# Patient Record
Sex: FEMALE | Race: WHITE | Employment: FULL TIME | URBAN - NONMETROPOLITAN AREA
[De-identification: names, ages, dates, MRNs, and addresses within clinical notes are randomized per-mention and may not be internally consistent; named-entity substitution may affect disease eponyms.]

---

## 2019-02-23 ENCOUNTER — OFFICE VISIT (OUTPATIENT)
Dept: PRIMARY CARE CLINIC | Age: 49
End: 2019-02-23
Payer: COMMERCIAL

## 2019-02-23 VITALS
TEMPERATURE: 97.8 F | WEIGHT: 222 LBS | OXYGEN SATURATION: 97 % | HEART RATE: 80 BPM | DIASTOLIC BLOOD PRESSURE: 88 MMHG | SYSTOLIC BLOOD PRESSURE: 122 MMHG

## 2019-02-23 DIAGNOSIS — J01.00 SUBACUTE MAXILLARY SINUSITIS: Primary | ICD-10-CM

## 2019-02-23 PROCEDURE — 99202 OFFICE O/P NEW SF 15 MIN: CPT | Performed by: NURSE PRACTITIONER

## 2019-02-23 RX ORDER — AMOXICILLIN AND CLAVULANATE POTASSIUM 875; 125 MG/1; MG/1
1 TABLET, FILM COATED ORAL 2 TIMES DAILY
Qty: 14 TABLET | Refills: 0 | Status: SHIPPED | OUTPATIENT
Start: 2019-02-23 | End: 2019-03-02

## 2019-02-23 ASSESSMENT — ENCOUNTER SYMPTOMS
SORE THROAT: 0
HOARSE VOICE: 0
SHORTNESS OF BREATH: 0
SWOLLEN GLANDS: 1
SINUS PRESSURE: 1
COUGH: 1

## 2023-11-04 DIAGNOSIS — B00.1 HERPESVIRAL VESICULAR DERMATITIS: ICD-10-CM

## 2023-11-04 RX ORDER — VALACYCLOVIR HYDROCHLORIDE 1 G/1
TABLET, FILM COATED ORAL
Qty: 4 TABLET | Refills: 5 | Status: SHIPPED | OUTPATIENT
Start: 2023-11-04

## 2023-11-08 PROBLEM — Z86.59 HISTORY OF DEPRESSION: Status: ACTIVE | Noted: 2023-11-08

## 2023-11-08 PROBLEM — R73.01 IMPAIRED FASTING GLUCOSE: Status: ACTIVE | Noted: 2023-11-08

## 2023-11-08 PROBLEM — D25.9 UTERINE LEIOMYOMA: Status: ACTIVE | Noted: 2023-11-08

## 2023-11-08 PROBLEM — D64.9 ANEMIA: Status: ACTIVE | Noted: 2023-11-08

## 2023-11-12 NOTE — PROGRESS NOTES
HPI:  pt here for routine wellness exam w/o any issues    PMH:   Past Medical History:   Diagnosis Date    Allergic Unknown    Fasting hyperglycemia     History of depression      MEDICATIONS:   Current Outpatient Medications   Medication Sig Dispense Refill    valACYclovir (Valtrex) 1 gram tablet TAKE 2 TABLETS BY MOUTH TWICE A DAY FOR 1 DAY, TAKE 12 HOURS APART 4 tablet 5     No current facility-administered medications for this visit.     ALLERGIES:    Allergies   Allergen Reactions    Diphenhydramine Hcl Drowsiness    Nutmeg Diarrhea and GI Upset     SOCIAL HX:    Social History     Tobacco Use    Smoking status: Never    Smokeless tobacco: Never   Vaping Use    Vaping Use: Never used   Substance Use Topics    Alcohol use: Not Currently     Comment: Maybe 2 drinks a year    Drug use: Never     FAMILY HX:   Family History   Problem Relation Name Age of Onset    No Known Problems Mother      No Known Problems Sister      No Known Problems Brother      No Known Problems Brother      No Known Problems Daughter      No Known Problems Daughter      No Known Problems Daughter         ROS:             CONSTITUTIONAL:          Negative for concerns, fever, chills.         HEENT:          no Difficulty swallowing.  no Hearing loss.  no Poor sense of smell.   No change in vision ; no headaches     CARDIOLOGY:          Chest pain none.  Palpitations none.  Shortness of breath none.         RESPIRATORY:          Negative for persistent cough , wheezing, trouble breathing.         GASTROENTEROLOGY:          Negative for abdominal pain, change in bowel habits.         ENDOCRINOLOGY:          Negative for fatigue, polydipsia, polyuria, weight loss, weight gain, cold intolerance, heat intolerance.         MUSCULOSKELETAL:          Negative for myalgias, joint pain.         DERMATOLOGY:          Negative for rash, bruising, moles.         NEUROLOGY:          Negative for weakness, headache, dizziness.     VITALS: /74    "Pulse 84   Ht 1.689 m (5' 6.5\")   Wt 73.1 kg (161 lb 3.2 oz)   SpO2 98%   BMI 25.63 kg/m²      PE:  General Appearance: awake, alert, oriented, in no acute distress  Skin: there are no suspicious lesions or rashes of concern  Head/Face: NCAT  Eyes: No gross abnormalities.  Ears: canals and TMs NI  Mouth/Throat: Mucosa moist, no lesions; pharynx without erythema, edema or exudate.  Neck: neck- supple, no mass, non-tender  Lungs: Normal expansion.  Clear to auscultation.  No rales, rhonchi, or wheezing.  Heart: Heart sounds are normal.  Regular rate and rhythm without murmur, gallop or rub.  Extremities: Extremities warm to touch, pink, with no edema.  Neurologic: Alert and oriented x 3, gait normal., reflexes normal and symmetric, strength and  sensation grossly normal  Psych exam: alert,oriented, in NAD with a full range of affect, normal behavior and no psychotic features    Lisa was seen today for annual exam.  Diagnoses and all orders for this visit:  Routine physical examination (Primary)  -     CBC; Future  -     Comprehensive Metabolic Panel; Future  -     Lipid Panel; Future  -     Hemoglobin A1C; Future  Need for hepatitis C screening test  -     Hepatitis C antibody; Future  Encounter for screening mammogram for malignant neoplasm of breast  -     BI mammo bilateral screening tomosynthesis; Future  Screening for colorectal cancer  -     Cologuard® colon cancer screening; Future  -     Cologuard® colon cancer screening  Need for vaccination  -     Tdap vaccine, age 7 years and older  (BOOSTRIX)         "

## 2023-11-17 ENCOUNTER — OFFICE VISIT (OUTPATIENT)
Dept: PRIMARY CARE | Facility: CLINIC | Age: 53
End: 2023-11-17
Payer: COMMERCIAL

## 2023-11-17 ENCOUNTER — LAB (OUTPATIENT)
Dept: LAB | Facility: LAB | Age: 53
End: 2023-11-17
Payer: COMMERCIAL

## 2023-11-17 VITALS
WEIGHT: 161.2 LBS | HEART RATE: 84 BPM | SYSTOLIC BLOOD PRESSURE: 122 MMHG | DIASTOLIC BLOOD PRESSURE: 74 MMHG | HEIGHT: 67 IN | OXYGEN SATURATION: 98 % | BODY MASS INDEX: 25.3 KG/M2

## 2023-11-17 DIAGNOSIS — Z12.31 ENCOUNTER FOR SCREENING MAMMOGRAM FOR MALIGNANT NEOPLASM OF BREAST: ICD-10-CM

## 2023-11-17 DIAGNOSIS — Z00.00 ROUTINE PHYSICAL EXAMINATION: Primary | ICD-10-CM

## 2023-11-17 DIAGNOSIS — Z12.12 SCREENING FOR COLORECTAL CANCER: ICD-10-CM

## 2023-11-17 DIAGNOSIS — Z12.11 SCREENING FOR COLORECTAL CANCER: ICD-10-CM

## 2023-11-17 DIAGNOSIS — Z11.59 NEED FOR HEPATITIS C SCREENING TEST: ICD-10-CM

## 2023-11-17 DIAGNOSIS — Z00.00 ROUTINE PHYSICAL EXAMINATION: ICD-10-CM

## 2023-11-17 DIAGNOSIS — Z23 NEED FOR VACCINATION: ICD-10-CM

## 2023-11-17 LAB
ALBUMIN SERPL BCP-MCNC: 4.2 G/DL (ref 3.4–5)
ALP SERPL-CCNC: 33 U/L (ref 33–110)
ALT SERPL W P-5'-P-CCNC: 12 U/L (ref 7–45)
ANION GAP SERPL CALC-SCNC: 8 MMOL/L (ref 10–20)
AST SERPL W P-5'-P-CCNC: 14 U/L (ref 9–39)
BILIRUB SERPL-MCNC: 0.6 MG/DL (ref 0–1.2)
BUN SERPL-MCNC: 16 MG/DL (ref 6–23)
CALCIUM SERPL-MCNC: 9.1 MG/DL (ref 8.6–10.3)
CHLORIDE SERPL-SCNC: 102 MMOL/L (ref 98–107)
CHOLEST SERPL-MCNC: 211 MG/DL (ref 0–199)
CHOLESTEROL/HDL RATIO: 2.6
CO2 SERPL-SCNC: 28 MMOL/L (ref 21–32)
CREAT SERPL-MCNC: 0.66 MG/DL (ref 0.5–1.05)
ERYTHROCYTE [DISTWIDTH] IN BLOOD BY AUTOMATED COUNT: 13 % (ref 11.5–14.5)
GFR SERPL CREATININE-BSD FRML MDRD: >90 ML/MIN/1.73M*2
GLUCOSE SERPL-MCNC: 88 MG/DL (ref 74–99)
HCT VFR BLD AUTO: 43.6 % (ref 36–46)
HDLC SERPL-MCNC: 81.9 MG/DL
HGB BLD-MCNC: 13.8 G/DL (ref 12–16)
LDLC SERPL CALC-MCNC: 112 MG/DL
MCH RBC QN AUTO: 29.7 PG (ref 26–34)
MCHC RBC AUTO-ENTMCNC: 31.7 G/DL (ref 32–36)
MCV RBC AUTO: 94 FL (ref 80–100)
NON HDL CHOLESTEROL: 129 MG/DL (ref 0–149)
NRBC BLD-RTO: 0 /100 WBCS (ref 0–0)
PLATELET # BLD AUTO: 284 X10*3/UL (ref 150–450)
POTASSIUM SERPL-SCNC: 4.3 MMOL/L (ref 3.5–5.3)
PROT SERPL-MCNC: 6.8 G/DL (ref 6.4–8.2)
RBC # BLD AUTO: 4.64 X10*6/UL (ref 4–5.2)
SODIUM SERPL-SCNC: 134 MMOL/L (ref 136–145)
TRIGL SERPL-MCNC: 84 MG/DL (ref 0–149)
VLDL: 17 MG/DL (ref 0–40)
WBC # BLD AUTO: 6.7 X10*3/UL (ref 4.4–11.3)

## 2023-11-17 PROCEDURE — 86803 HEPATITIS C AB TEST: CPT

## 2023-11-17 PROCEDURE — 90715 TDAP VACCINE 7 YRS/> IM: CPT | Performed by: FAMILY MEDICINE

## 2023-11-17 PROCEDURE — 83036 HEMOGLOBIN GLYCOSYLATED A1C: CPT

## 2023-11-17 PROCEDURE — 90471 IMMUNIZATION ADMIN: CPT | Performed by: FAMILY MEDICINE

## 2023-11-17 PROCEDURE — 36415 COLL VENOUS BLD VENIPUNCTURE: CPT

## 2023-11-17 PROCEDURE — 99396 PREV VISIT EST AGE 40-64: CPT | Performed by: FAMILY MEDICINE

## 2023-11-17 PROCEDURE — 1036F TOBACCO NON-USER: CPT | Performed by: FAMILY MEDICINE

## 2023-11-17 ASSESSMENT — PATIENT HEALTH QUESTIONNAIRE - PHQ9
2. FEELING DOWN, DEPRESSED OR HOPELESS: NOT AT ALL
1. LITTLE INTEREST OR PLEASURE IN DOING THINGS: NOT AT ALL
1. LITTLE INTEREST OR PLEASURE IN DOING THINGS: NOT AT ALL
SUM OF ALL RESPONSES TO PHQ9 QUESTIONS 1 AND 2: 0
2. FEELING DOWN, DEPRESSED OR HOPELESS: NOT AT ALL
SUM OF ALL RESPONSES TO PHQ9 QUESTIONS 1 AND 2: 0

## 2023-11-17 ASSESSMENT — ENCOUNTER SYMPTOMS
DEPRESSION: 0
LOSS OF SENSATION IN FEET: 0
OCCASIONAL FEELINGS OF UNSTEADINESS: 0

## 2023-11-17 ASSESSMENT — LIFESTYLE VARIABLES
HOW OFTEN DO YOU HAVE SIX OR MORE DRINKS ON ONE OCCASION: NEVER
AUDIT-C TOTAL SCORE: 1
HOW OFTEN DO YOU HAVE A DRINK CONTAINING ALCOHOL: MONTHLY OR LESS
HOW MANY STANDARD DRINKS CONTAINING ALCOHOL DO YOU HAVE ON A TYPICAL DAY: 1 OR 2
SKIP TO QUESTIONS 9-10: 1

## 2023-11-17 ASSESSMENT — PROMIS GLOBAL HEALTH SCALE
RATE_AVERAGE_FATIGUE: MILD
RATE_GENERAL_HEALTH: EXCELLENT
CARRYOUT_SOCIAL_ACTIVITIES: EXCELLENT
RATE_PHYSICAL_HEALTH: EXCELLENT
RATE_SOCIAL_SATISFACTION: EXCELLENT
RATE_AVERAGE_PAIN: 2
CARRYOUT_PHYSICAL_ACTIVITIES: COMPLETELY
EMOTIONAL_PROBLEMS: RARELY
RATE_QUALITY_OF_LIFE: EXCELLENT
RATE_MENTAL_HEALTH: EXCELLENT

## 2023-11-17 ASSESSMENT — PAIN SCALES - GENERAL: PAINLEVEL: 2

## 2023-11-17 NOTE — PATIENT INSTRUCTIONS
PAIN MANAGEMENT IN ADULTS    What is pain? Pain is your body’s way to reacting to injury or illness. Everybody reacts to pain in different ways. What you think is painful may not be painful to someone else. But, pain is whatever you say it is!  What causes pain? Many things, such as an injury, surgery, or a disease can cause pain. Some pain is caused by pressure one a nerve, such as a cancerous tumor or slipped disc. Other pain is caused when nerves are cut as in an accident or surgery. After an injury or surgery you may not want to move the painful part of our body at all. But, you may have pain because you are not moving this body part. Sometimes there is no clear reason for your pain.    What are the different types of pain?  Acute pain is short?lived and lasts less than 3 months. Caregivers help first work to remove the cause of the pain, such as fixing a broken arm. Acute pain usually can be controlled or stopped with pain medicine.  Chronic pain lasts longer than 3?6 months. This kind of pain is often more complicated. Caregivers may use medicine along with other treatments, like self?hypnosis and relaxation to help you learn to deal with the chronic pain.  What is your pain like? Caregivers want you to talk to them about your pain. This helps them learn what may be causing the pain and how best to treat it.  Why is pain control important? Pain can affect your appetite, how well you sleep, your energy and your ability to do things. Pain can also affect your mood and relationship with others. If caregivers can help you control your pain, you will suffer less and can even heal faster.  How can pain medicine be given? Following are the many different ways pain medicine can be given depending on the kind of pain you are experiencing.  By mouth: you may be given pills or liquid to swallow or you may be given a pill or liquid to put under your tongue. Some medicine can also be given as a lozenge like a cough drop or  even a special lollipop.  Rectal: medicine in a suppository is put into your rectum.  Shot/Injection: pain medicine can be given as a shot/injection into an IV, into a muscle or under the skin  Topical: medicine in a cream or gel is spread over the skin.  Transdermal: medicine given in a patch and put onto the skin. This medicine is released slowly to give pain relief for as long as 72 hours.    How can you take pain medicine safely and make it work best for you? The following are many different ways pain medicine can be given depending on the kind of pain you have.  Some pain medicines can make you breathe less deeply and less often. The medicine may also make you sleepy, dizzy, and unsafe to drive a car or use heavy equipment. For these reasons, it is very important to follow your caregiver’s advice on how to use this medicine.  Sometimes the pain is worse when you first wake up in the morning. This may happened if you did not have enough pain medicine in your blood stream to last through the night. Caregivers may tell you to take a dose of pain medicine during the night.  Some foods, alcohol, and other medicines may cause unpleasant side effects when you take pain medicine. Follow your caregiver’s advice about how to prevent these problems.  Pain medicine can make you constipated. Straining with a BM can make you pain worse. Do not try to push the BM out if it’s too hard. Contact your caregiver if you become constipated.  Other non?drug pain control methods. There are many pain control techniques that can held you deal with pain even if it does not go away completely. It is important to practice some of the techniques when you don’t have pain if possible. This will help the technique work better during an attack of pain.  Cold and heat: both cold and heat can help lessen some types of post?op pain. Caregivers will tell you if cold and/or hot packs will help your pain.  Distraction: teaches you to focus your  attention on something other than pain. Playing cards or games, talking and visiting family may relax you and keep you from thinking about pain.  Hydrotherapy: is a gentle water exercise program. It can strengthen muscles that are not injured and lessen inflammation. Talk to your physical therapist or your caregiver about starting a hydrotherapy program.  Massage  Music  Physical therapy  Rest  Surgery/Nerve blocks  Call your caregiver if you have any of the following problems:  The pain medicine you are taking makes you sleepier than usual or confused  You have new pain or the pain seems different than before  You have constipation  Care agreement: You have the right to help plan your care. To help with this plan you must learn about your pain, what is causing it, and how it can be treated. You can then discuss treatment options with your caregivers. Work with them to decide what care will be used to treat you. You always have the right to refuse treatment.

## 2023-11-18 LAB
EST. AVERAGE GLUCOSE BLD GHB EST-MCNC: 100 MG/DL
HBA1C MFR BLD: 5.1 %
HCV AB SER QL: NONREACTIVE

## 2024-11-22 NOTE — PROGRESS NOTES
HPI:  pt here for routine wellness exam w/o any issues    PMH:   Past Medical History:   Diagnosis Date    Allergic Unknown    Fasting hyperglycemia     History of depression      MEDICATIONS:   Current Outpatient Medications   Medication Sig Dispense Refill    valACYclovir (Valtrex) 1 gram tablet Take 2 tablets (2,000 mg) by mouth 2 times a day for 6 days. 4 tablet 5     No current facility-administered medications for this visit.     ALLERGIES:    Allergies   Allergen Reactions    Diphenhydramine Hcl Drowsiness    Nutmeg Diarrhea and GI Upset     SOCIAL HX:    Social History     Tobacco Use    Smoking status: Never    Smokeless tobacco: Never   Vaping Use    Vaping status: Never Used   Substance Use Topics    Alcohol use: Not Currently     Comment: Maybe 2 drinks a year    Drug use: Never     FAMILY HX:   Family History   Problem Relation Name Age of Onset    No Known Problems Mother      No Known Problems Sister      No Known Problems Brother      No Known Problems Brother      No Known Problems Daughter      No Known Problems Daughter      No Known Problems Daughter         ROS:             CONSTITUTIONAL:          Negative for concerns, fever, chills.         HEENT:          no Difficulty swallowing.  no Hearing loss.  no Poor sense of smell.   No change in vision ; no headaches     CARDIOLOGY:          Chest pain none.  Palpitations none.  Shortness of breath none.         RESPIRATORY:          Negative for persistent cough , wheezing, trouble breathing.         GASTROENTEROLOGY:          Negative for abdominal pain, change in bowel habits.         ENDOCRINOLOGY:          Negative for fatigue, polydipsia, polyuria, weight loss, weight gain, cold intolerance, heat intolerance.         MUSCULOSKELETAL:          Negative for myalgias, joint pain.         DERMATOLOGY:          Negative for rash, bruising, moles.         NEUROLOGY:          Negative for weakness, headache, dizziness.     VITALS: /68   Pulse  "87   Ht 1.689 m (5' 6.5\")   Wt 81.7 kg (180 lb 3.2 oz)   SpO2 99%   BMI 28.65 kg/m²      PE:  General Appearance: awake, alert, oriented, in no acute distress  Skin: there are no suspicious lesions or rashes of concern  Head/Face: NCAT  Eyes: No gross abnormalities.  Ears: canals and TMs NI  Mouth/Throat: Mucosa moist, no lesions; pharynx without erythema, edema or exudate.  Neck: neck- supple, no mass, non-tender  Lungs: Normal expansion.  Clear to auscultation.  No rales, rhonchi, or wheezing.  Heart: Heart sounds are normal.  Regular rate and rhythm without murmur, gallop or rub.  Extremities: Extremities warm to touch, pink, with no edema.  Neurologic: Alert and oriented x 3, gait normal., reflexes normal and symmetric, strength and  sensation grossly normal  Psych exam: alert,oriented, in NAD with a full range of affect, normal behavior and no psychotic features    Lisa was seen today for annual exam.  Diagnoses and all orders for this visit:  Well adult exam (Primary)  Recurrent cold sores  -     valACYclovir (Valtrex) 1 gram tablet; Take 2 tablets (2,000 mg) by mouth 2 times a day for 6 days.  Encounter for screening mammogram for malignant neoplasm of breast  -     BI mammo bilateral screening tomosynthesis; Future           "

## 2024-12-10 ENCOUNTER — OFFICE VISIT (OUTPATIENT)
Dept: PRIMARY CARE | Facility: CLINIC | Age: 54
End: 2024-12-10
Payer: COMMERCIAL

## 2024-12-10 ENCOUNTER — TELEPHONE (OUTPATIENT)
Dept: PRIMARY CARE | Facility: CLINIC | Age: 54
End: 2024-12-10

## 2024-12-10 VITALS
HEART RATE: 87 BPM | DIASTOLIC BLOOD PRESSURE: 68 MMHG | WEIGHT: 180.2 LBS | SYSTOLIC BLOOD PRESSURE: 114 MMHG | HEIGHT: 67 IN | BODY MASS INDEX: 28.28 KG/M2 | OXYGEN SATURATION: 99 %

## 2024-12-10 DIAGNOSIS — B00.1 RECURRENT COLD SORES: ICD-10-CM

## 2024-12-10 DIAGNOSIS — Z12.31 ENCOUNTER FOR SCREENING MAMMOGRAM FOR MALIGNANT NEOPLASM OF BREAST: ICD-10-CM

## 2024-12-10 DIAGNOSIS — Z00.00 WELL ADULT EXAM: Primary | ICD-10-CM

## 2024-12-10 PROCEDURE — 99396 PREV VISIT EST AGE 40-64: CPT | Performed by: FAMILY MEDICINE

## 2024-12-10 PROCEDURE — 3008F BODY MASS INDEX DOCD: CPT | Performed by: FAMILY MEDICINE

## 2024-12-10 PROCEDURE — 1036F TOBACCO NON-USER: CPT | Performed by: FAMILY MEDICINE

## 2024-12-10 RX ORDER — VALACYCLOVIR HYDROCHLORIDE 1 G/1
2000 TABLET, FILM COATED ORAL 2 TIMES DAILY
Qty: 4 TABLET | Refills: 5 | Status: SHIPPED | OUTPATIENT
Start: 2024-12-10 | End: 2024-12-16

## 2024-12-10 ASSESSMENT — PATIENT HEALTH QUESTIONNAIRE - PHQ9
2. FEELING DOWN, DEPRESSED OR HOPELESS: NOT AT ALL
SUM OF ALL RESPONSES TO PHQ9 QUESTIONS 1 AND 2: 0
1. LITTLE INTEREST OR PLEASURE IN DOING THINGS: NOT AT ALL

## 2024-12-10 ASSESSMENT — PAIN SCALES - GENERAL: PAINLEVEL_OUTOF10: 0-NO PAIN

## 2024-12-10 NOTE — TELEPHONE ENCOUNTER
The pharmacy is needing clarification on the valcyclovir.  It is only for 4 tabs and the directions say 2 tabs daily for 6 days

## 2024-12-12 ENCOUNTER — APPOINTMENT (OUTPATIENT)
Dept: RADIOLOGY | Facility: HOSPITAL | Age: 54
End: 2024-12-12
Payer: COMMERCIAL

## 2024-12-19 ENCOUNTER — HOSPITAL ENCOUNTER (OUTPATIENT)
Dept: RADIOLOGY | Facility: HOSPITAL | Age: 54
Discharge: HOME | End: 2024-12-19
Payer: COMMERCIAL

## 2024-12-19 VITALS — WEIGHT: 176 LBS | BODY MASS INDEX: 26.67 KG/M2 | HEIGHT: 68 IN

## 2024-12-19 DIAGNOSIS — Z12.31 ENCOUNTER FOR SCREENING MAMMOGRAM FOR MALIGNANT NEOPLASM OF BREAST: ICD-10-CM

## 2024-12-19 PROCEDURE — 77067 SCR MAMMO BI INCL CAD: CPT | Performed by: RADIOLOGY

## 2024-12-19 PROCEDURE — 77067 SCR MAMMO BI INCL CAD: CPT

## 2024-12-19 PROCEDURE — 77063 BREAST TOMOSYNTHESIS BI: CPT | Performed by: RADIOLOGY

## 2025-04-21 ENCOUNTER — TELEPHONE (OUTPATIENT)
Dept: PRIMARY CARE | Facility: CLINIC | Age: 55
End: 2025-04-21
Payer: COMMERCIAL

## 2025-04-21 ENCOUNTER — OFFICE VISIT (OUTPATIENT)
Dept: PRIMARY CARE | Facility: CLINIC | Age: 55
End: 2025-04-21
Payer: COMMERCIAL

## 2025-04-21 VITALS
WEIGHT: 162 LBS | HEART RATE: 101 BPM | OXYGEN SATURATION: 99 % | DIASTOLIC BLOOD PRESSURE: 66 MMHG | SYSTOLIC BLOOD PRESSURE: 124 MMHG | BODY MASS INDEX: 24.63 KG/M2

## 2025-04-21 DIAGNOSIS — J30.2 SEASONAL ALLERGIES: Primary | ICD-10-CM

## 2025-04-21 PROBLEM — D25.9 UTERINE LEIOMYOMA: Status: RESOLVED | Noted: 2023-11-08 | Resolved: 2025-04-21

## 2025-04-21 PROBLEM — Z90.710 ACQUIRED ABSENCE OF BOTH CERVIX AND UTERUS: Status: ACTIVE | Noted: 2025-04-21

## 2025-04-21 PROCEDURE — 99213 OFFICE O/P EST LOW 20 MIN: CPT | Performed by: FAMILY MEDICINE

## 2025-04-21 PROCEDURE — 1036F TOBACCO NON-USER: CPT | Performed by: FAMILY MEDICINE

## 2025-04-21 ASSESSMENT — PATIENT HEALTH QUESTIONNAIRE - PHQ9
1. LITTLE INTEREST OR PLEASURE IN DOING THINGS: NOT AT ALL
SUM OF ALL RESPONSES TO PHQ9 QUESTIONS 1 AND 2: 0
2. FEELING DOWN, DEPRESSED OR HOPELESS: NOT AT ALL

## 2025-04-21 ASSESSMENT — PAIN SCALES - GENERAL: PAINLEVEL_OUTOF10: 4

## 2025-04-21 NOTE — TELEPHONE ENCOUNTER
She is having seasonal allergies and she is taking claritin and having drainage and now her left ear is hurting. She is asking if she can see you.

## 2025-04-21 NOTE — PROGRESS NOTES
Sinusitis:   has sinus allergies. For 2 wksw         presents with c/o Facial pain No          Nasal congestion Yes          Rhinorrhea Yes clear         Fever Yes          Sore throat No          Post-nasal drip Yes  with sneezing. Using claritin d         Cough  no   Ear/General:          c/o Pain left     ENT/Respiratory:          c/o Shortness of breath No          wheezing No     MEDICAL HISTORY:    Patient Active Problem List   Diagnosis    Anemia    History of depression    Impaired fasting glucose    Acquired absence of both cervix and uterus      MEDICATIONS:    No current outpatient medications on file.     No current facility-administered medications for this visit.     ALLERGIES:   Allergies   Allergen Reactions    Diphenhydramine Hcl Drowsiness    Nutmeg Diarrhea and GI Upset         ROS:       CONSTITUTION:  see HPI for details        HEENT:          see HPI for details.         RESPIRATORY:          See HPI for details.            VITAL SIGNS:  /66   Pulse 101   Wt 73.5 kg (162 lb)   SpO2 99%   BMI 24.63 kg/m²     PE:  General: alert and oriented; NAD   HEENT:  nc/at, sinuses NT, ears with nl external canals, TM normal w/o redness, but right TM bulging and left TM retracted; throat normal w/o redness/exudate/tonsillar swelling  NECK:  no lymphadenopathy; FROM  LUNGS:  CTA, no wheezing/rhonchi/rales  HEART:  RRR, no murmurs    Lisa was seen today for sinusitis.  Diagnoses and all orders for this visit:  Seasonal allergies (Primary)